# Patient Record
(demographics unavailable — no encounter records)

---

## 2025-01-07 NOTE — NUR
UPDATE
 
MD HOLLIDAY NOTIFIED OF CRITICAL TROPONIN. PT NPO AWAITING CARDIOLOGY CONSULT.
MD HOLLIDAY ALSO NOTIFIED OF PRESSURE SORE TO BUTTOCKS. NO NEW ORDERS.

## 2025-01-07 NOTE — NUR
PCU ADMIT / END OF SHIFT
 
PT BROUGHT TO PCU-03 BY [facility] FROM ER @ APPROX 1630. PT OPENS EYES TO VERBAL
STIMULI W/ TOUCH. PT L EYE NOTEABLY SWOLLEN W/ YELLOW DISCHARGE. PT W/ VARIOUS
SCATTERED BRUISES. PT INCONTINENT W/ SATURATED ATTENDS. SCROTUM & PENIS RED &
SWOLLEN. PT ALSO INCONTINENT OF STOOL W/ SMALL PASTY BM STICKING OUT OF
RECTUM. DES CARE PERFORMED. PRESSURE SORE NOTED TO BUTTOCKS. WOUND PHOTOS
TAKEN & PLACED IN CHART. BLE W/ SCABS. RLE MORE SWOLLEN & RED THAN LLE. WOUND
PHOTOS PRINTED & PLACED IN CHART. PT ABLE TO STATE OWN NAME & . PT
UNABLE TO ANSWER ANY OTHER ASSESSMENT Qs. VSS. SPO2 > 92% ON 4L NC VIA MOUTH
D/T PT MOUTH BREATHING. MONITOR SHOWING ST, HR 110s. TEMP ELEVATED @ 100.1. PT
SLEEPING AFTER CARE PROVIDED. HEPARIN GTT INFUSING PER ORDERS UPON ARRIVAL TO
UNIT. PHARMACY NOTIFIED OF HEPARIN BOLUS NOT GIVEN. PHARMACY INSTRUCTING TO
GIVE HEPARIN BOLUS. HEPARIN BOLUS GIVEN PER ORDERS. NS GTT NOW INFUSING PER
ORDERS. HOLDING PO MEDICATIONS AT THIS TIME D/T PT INABILITY TO REMAIN AWAKE &
FOLLOW INSTRUCTIONS. PT ALSO NPO AWAITING CARDIOLOGY CONSULT. NO FAMILY AT
BEDSIDE. BED ALARM ON.

## 2025-01-08 NOTE — NUR
SHIFT SUMMARY
 
PT ALERT, ORIENTED TO NAME/ AND YEAR. CALM COOPERATIVE TO CARE.  HR IN THE
90'S, SINUS RHYTHM, DENIES CP/PRESSURE, NUMB/TINGLING, SBP IN THE 90'S-110'S.
O2 >90% ON 5-7L VIA HIGH FLOW NC, PT IS A MOUTH BREATHER WHILE ASLEEP AND DOES
BETTER WITH OXYMIZER WHILE SLEEPING. L/S COARSE T/O. PUREWICK IN PLACE, RAHEL
URINE PRESENT. HE HAS REDNESS TO THE BLE, WARM TO TOUCH, RIGHT WORSE THAN
LEFT. BEDSIDE SWALLOW EVAL DONE WITH PT THIS AM, PT TOLERATED PO MEDS WITH
APPLESAUCE. ST CAME TO BEDSIDE TO EVALUATE PT. PT ADVANCED TO MINCED AND MOIST
DIET. PT TO TAKE PILLS/EAT SITTING UPRIGHT AT 90 DEGREES. CARDIOLOGY TO
BEDSIDE TO DISCUSS PLAN OF CARE, MEDICALLY MANAGING PT. PT TO CONTINUE IV ABX.
HE DENIES ANY QUESTIONS OR  CONCERNS AT THIS TIME WILL MONITOR PT AND REPORT
TO NIGHT SHIFT RN.

## 2025-01-08 NOTE — NUR
SHIFT SUMMARY
 
A/Ox1-2 AND COOPERATIVE WITH CARE. FREQUENTLY CONFUSED AND VERY [place]. ABLE TO
BE REORIENTED AND ABLE TO MAKE HIS NEEDS KNOWN. CARDIAC, REMAINS IN SR 70-90'S
WITH NO C/O CP, PRESSURE OR DIZZINESS. TRENDED TROPONINS DURING THE NIGHT, SBP
HAS REMAINED STABLE. RESPIRATORY, MAINTAINS SPO2 >90% ON 5-7L HFNC WITH NO C/O
SOB OR DYSPNEA. GI/, INCONTINENT OF BOTH BOWEL AS WELL AS URINE. ATTENDS IN
PLACED, CHANGED PRN TO KEEP C/D/I. PW IN PLACED DRAINING RAHEL COLORED TO
URINE. MEPILEX ON COCCYX CHANGED PRN TO KEEP C/D/I AS WELL. CRITICAL RECEIVED
SHOWING BC GROWING GRAM POSTIVE COCCI IN CHAINS. NS CONINUES TO INFUSE PER
EMAR. STEP SON FROM [place] ([facility]) REPORTS HE IS PRIMARY POINT OF CONTACT
[phone]. NO NEW ORDERS AT THIS TIME, WILL REPORT TO ONCOMING RN. TATYANA,
JACKELYN AS OF THIS NOTE.

## 2025-01-08 NOTE — NUR
ASSUMPTION OF CARE
 
PT ALERT, OREINTED TO NAME/, AND ABLE TO NAME DAUGHTER-IN-LAW AT BEDSIDE,
PT UNSURE WHERE HE IS AND WHAT BROUGHT HIM IN.  WHEN ASKED THE YEAR HE
ORIGINALLY SAID HE DID NOT KNOW, WHEN ASKED A FEW HOURS LATER PT ABLE TO STATE
THE YEAR. HR IN THE 80'S-90'S, DENIES CP/PRESSURE, NUMB/TINGLING, SBP IN THE
90'S-110'S, DENIES DIZZY/LIGHTHEADED. O2 RANGING FROM 88-94%, PT OCASIONALLY
DESATS TO THE 70'S-80'S. PT WAS ON HIGH FLOW NC THIS AM AT 9L, PT FELL ASLEEP
AND DESATED, SWITCHED TO OXYMIZER AT10L WHILE SLEEPING AND O2 BACK TO THE
90'S. PT WITH PUREWICK IN PLACE, RAHEL URINE DRAINING. WHEN ASKED ABOUT PAIN
HE DENIED ANY, PUT REPORTED PAIN IN THE LEFT LEG WHILE LIFTING IT. HE
HAS REDNESS AND WARM TO TOUCH OF THE BLE. REDNESS WORSE IN THE RIGHT LEG. PT
ALSO WITH REDNESS AND IRRITATION TO THE LEFT EYE, OINTMENT ORDERED. PT DENIES
ANY QUESTIONS AT THIS TIME. WILL MONITOR PT.

## 2025-01-09 NOTE — NUR
SHIFT SUMMARY
PATIENT HAD NO ACUTE CHANGES. AXOX 2 AND BEDREST. ON 6L O2 HIGH FLOW OR
OXYMIZER MASK AT NIGHT. PIV INTACT. IV ABX INFUSED. HEPARIN INFUSING @
33.1 mL/HR MANAGED BY PHARMACY. TELE MONITOR NSR 88 WITH PAC, PVC, 1ST DEGREE,
AND BBB. PUREWICK IN PLACE. SBP'S 100-120'S. DENIES CHEST PAIN, SOB, AND N/V.
TAKES MEDS WHOLE WITH APPLE SAUCE. SLEPT ON/OFF. CALL LIGHT IN REACH. BED IN
LOWEST POSITION. WILL CONTINUE TO MONITOR UNTIL DAY SHIFT NURSE ASSUMES CARE.

## 2025-01-09 NOTE — NUR
ASSESSMENT/ASSUMED CARE
PT SITTING UP IN BED WATCHING TV. DENIES PAIN OR DISCOMFORT. A&O, FOLLOWING
INSTRUCTIONS. [place]. LUNGS CLEAR BUT DECREASED ON 4 LITERS VIA NC. RESP EVEN AND
NONLABORED. DENIES SOB OR COUGH. HEART RATE REGULAR SINUS RHYTHM 70'S WITH
PAC'S. DENIES CHEST PAIN OR PRESSURE. EDEMA TO BILAT LOWER EXT WITH RIGHT
LARGER THAN LEFT. CELLULITITIS NOTED TO BILAT LOWER EXT. NOT PASSED MARKED
LINES. ELEVATED LOWER EXT ON TWO PILLOWS. BT+ ABD SOFT AND NONTENDER. DENIES
N/V. TAKING FLUIDS WITHOUT DIFFICULTY. IV LEFT FOREARM WITH HEPARIN AT 23
UNITS/KG/HR. SITE CLEAR. IV TO RIGHT AC SALINE LOCKED. SITE CLEAR. PUREWICK
INTACT, PT VOIDING CLEAR YELLOW URINE. DRSG TO COCCYX INTACT. ATTENDS CD&I.
REPOSITIONED. CALL LIGHT WITHIN REACH

## 2025-01-09 NOTE — NUR
SHIFT SUMMARY
 
PT ALERT, ORIETNED TO NAME/, PLACE, AND YEAR. PT SLEEPY THIS AM BUT WOKE UP
AND WAS ALERT AND ORIENTED. MENTATION IMPROVED TODAY. SINUS RHYTHM, HR IN THE
70'S. PT DENIES CP/PRESSURE, NUMB/TINGLING. SBP IN 'S. O2 >92%,
RECIEVING 5-7L VIA NC, DENIES SOB AT THIS TIME. PT OCASIONALLY DESATS TO THE
80'S, RECOVERS WITH DEEP BREATHING. PUREWICK IN PLACE. PT WITH SMALL SMEAR IN
BREIF THIS AM. HE HAS A PRESSURE SORE ON THE BOTTOCKS, PRESENT SINCE
ADMISSION, MEPILEX IN PLACE AND CHANGED THIS SHIFT. HE HES REDNESS/WARM TO
TOUCH OF LOWER EXTREMETIES, REDNESS OUTLINED WITH MARKER. VENOUS DUPLEX OF
BILAT BLE'S COMPLETED THIS AFTERNOON. PT TOLERATING PO INTAKE/MEDICATIONS.
FAMILY AT BEDSIDE WITH PT. HE DENIES ANY QUESTIONS/CONCERNS AT THIS TIME. WILL
MONITOR AND REPORT TO NIGHT SHIFT RN.

## 2025-01-10 NOTE — NUR
PATIENT TO CT PE STUDY. SON DONNELL AT BEDSIDE. DR. BURRIS IN TO UPDATE SON. SON
DISCUSSES PATIENT NEEDING PLACEMENT UPON DISCHARGE. CASE MANAGEMENT INVOLVED.
AFTERNOON VITALS STABLE. SINUS RHYTHM WITH HR 70'S. CONTINUES TO WEAR 4-6L
HIGH FLOW DEPENDING ON ACTIVITY. Q2 TURNING. CULTURE SENT OF COCCYX WOUND,
PURULENT DRAINAGE. PICTURE UPDATED IN CHART. PATIENT
WITH MULTIPLE BOWEL MOVEMENTS. PT/OT IN TO WORK WITH PATIENT THIS AFTERNOON.
PATIENT EATING LUNCH AT THIS TIME. SON REMAINS AT BEDSIDE.

## 2025-01-10 NOTE — NUR
AM NOTE:
PATIENT ALERT AND ORIENTED. FORGETFUL. POOR HISTORIAN. VERY TALKATIVE AND
HAPPY. MOVING ALL EXTREMITIES. OVERALL WEAK. PATIENT REPORTS USING WALKER AT
[facility]. PT/OT ORDERS IN PLACE. PATIENT ASSISTING STAFF WITH TURNS. Q2
TURNING AND AS NEEDED. PERRLA WITH LEFT EYELID DROOPING, RED, CRUSTY AND
SLIGHTLY SWOLLEN. EYE OINTMENT APPLIED PER EMAR.
 
TELE SHOWING SR WITH PVC'S. HR 70'S. 'S. DENIES CHEST
PAIN/PALPITATIONS/PRESSURE. EDEMA NOTED TO BLE, WORSE IN RIGHT. HEPARIN GTT
INFUSING PER EMAR.
 
ON 4-6L HIGH FLOW NASAL CANNULA SATING MID 90'S. LUNG SOUNDS DIMINISHED. EVEN
AND UNLABORED RESPIRTATIONS. OCCASIONAL DRY COUGH. PLAN FOR CT PE STUDY THIS
AFTERNOON. DENIES SOB.
 
BOWEL TONES PRESENT. SPEECH THERAPY IN TO WORK WITH PATIENT. ADVANCED DIET.
ATTENDS IN PLACE AS WELL AS PUREWICK. PATIENT HAD MODERATE INCONTINENT BOWEL
MOVEMENT THIS MORNING. DENIES ABDOMINAL PAIN/NAUSEA. DENTURES IN PLACE.
 
SKIN OVERALL FRAGILE, PALE, BRUISED AND DRY. PRESSURE WOUND TO COCCYX.
DRESSING CHANGED THIS AM. BLE/TOES SCABBING. BLE RED, SWOLLEN AND WARM. WORSE
IN RIGHT. SKIN OUTLINED WITH MARKER. PATIENT REPORTS REDNESS IMPROVING. BLE
ELEVATED WHEN IN BED.
 
CALL LIGHT IN REACH. PATIENT DENIES NEEDS AT THIS TIME.

## 2025-01-10 NOTE — NUR
SHIFT SUMMARY
RESTING QUIETLY AT THIS TIME. PT AWAKE MOST OF NIGHT WATCHING TV AND TALKING
WITH STAFF. TURNED Q2HR SIDE TO SIDE TO KEEP OFF COCCYX WOUND. DRSG TO
BOTTOM CHANGED TWICE DURING THE NIGHT AND PT TOLERATED WELL. PT HAS BEEN ON 4
LITERS HIFLOW NC THROUGHOUT THE NIGHT. DENIES SOB OR COUGH. PT HAD 2.72 SEC
PAUSE WITH HEART RATE DURING THE NIGHT. NO CHEST PAIN OR PRESSURE. REPORT TO
ON COMING NURSE

## 2025-01-10 NOTE — NUR
SHIFT SUMMARY:
NO ACUTE CHANGES. SEE PREVIOUS NOTES. PATIENT REMAINS ALERT AND ORIENTED. Q2
TURNING AND AS NEEDED. DENIES PAIN THROUGHOUT SHIFT. HEPARIN GTT CONTINUES TO
INFUSE. ON 6L HIGH FLOW NASAL CANNULA. TELE READING SR WITH HR 60-70'S. IV ABX
INFUSED. MULTIPLE BOWEL MOVEMENTS. PUREWICK IN PLACE. CALL LIGHT IN REACH.
PATIENT EATING DINNER AT THIS TIME.

## 2025-01-12 NOTE — NUR
SHIFT SUMMARY
 
THE PT IS VERY PLEASENT AND A&OX3-4. HE CAN BE FOREGETFUL AND IS VERY [place]. THE
PT DOES NOT ALWAYS FOLLOW CONVERSATION LIKELY D/T HEARING DEFICIT. THE PTHAS
BEEN ON RA T/O THE SHIFT AND DENIES ANY SOB. THE PT IS ON TELE AND IS SITTING
SR 60'S-70'S. BP REMAINS STABLE. HE IS TOLERATING PO INTAKE WELL. MALE WICKING
SYSTEM IN PLACE AND DRAINING TO SUCTION, GOOD YELLOW OUTPUT. NO ACUTE EVENTS
THIS SHIFT. THE PT WILL BE NPO BESIDES WATER AT 0000. STRESS TEST PLANNED FOR
1/13. NO ACUTE EVENTS, SEE NOTES FOR UPDATES.

## 2025-01-12 NOTE — NUR
SHIFT SUMMARY- NO ACUTE EVENTS
 
NEURO: A/OX4. EQUAL STRENGTH. PERRLA
LUNGS: NC 1L. DIMINISHED BASES. SHORT EPISODES OF SLEEP APNEA.
CARDIAC: BUNDLE BRANCH BLOCK. BLE EDEMA, RIGHT
GREATER THAN LEFT.
GI/: WICKING DEVICE IN PLACE, BM THIS SHIFT. LEFT ISCHIUM DRESSING CHANGED

## 2025-01-13 NOTE — NUR
SHIFT SUMMARY
 
PT ALERT, ORIENTED BUT CAN BE FORGETFUL. SP02>90% ON RA. DENIES SOB. TELEMETRY
SHOWS NSR W/ 1ST DEGREE. PT NPO THIS AM FOR 1ST PORTION OF STRESS TEST, DONE
T/O THE DAY. NPO AT MIDNIGHT FOR SECOND PORTION. PURWIK TO SUCTION.
INCONTINENT OF STOOL, BM X3 THIS SHIFT. C/D ATTENDS IN PLACE. DENIES PAIN. Q2H
REPOSITIONING. FEEDER, HAS DIFFICULTY WITH DEXTERITY/EATING. MEDS W/
APPLESAUCE/PUDDING. PT RESTING IN ROOM. CALL LIGHT IN REACH.

## 2025-01-14 NOTE — NUR
DISCHARGE SUMMARY @ APPROX 1130
 
A&O X4, OBEYS COMMANDS, ABLE TO MAKE NEEDS KNOWN, HARD OF HEARING, AMBULATING
1 PERSON WITH FWW AND GAIT BELT. CONTINUOUS SPO2, SPO2 GREATER THAN 90% ON RA,
LUNGS SOUND CLEAR T/O WITH DIMINISHED BASES. CONTINUOUS TELE MONITORING, HR 90
S, SINUS RHYTHM WITH 1 DEGREE HEART BLOCK, PT DENIES CHEST P/P T/O THIS SHIFT,
PULSES PRESENT T/O. MALE PURE WICK IN PLACE CONNECTED TO LOW CONTINUOUS
SUCTION. PT DENIES PAIN. BLE RENDESS THAT IS WITHIN THE DRAWN MARKINGS, HAS
SOME SCABS ON BLE SHINS, REDNESS ON COCCYX AND DES AREA, LEFT EYE HAS SOME
REDNESS.
IV LEFT IN PLACE FOR IV ABX TREATMENT, DRESSING REPLACED THIS SHIFT. PT
ASSISTED TO GET DRESSED. PT LEFT VIA WHEEL CHAIR AND MEDICAL TRANSPORT @
APPROX 1130. REPORT GIVEN TO GEOVANNY SMITH @ ARRPOX 1150.